# Patient Record
Sex: MALE | Race: WHITE | NOT HISPANIC OR LATINO | Employment: FULL TIME | URBAN - METROPOLITAN AREA
[De-identification: names, ages, dates, MRNs, and addresses within clinical notes are randomized per-mention and may not be internally consistent; named-entity substitution may affect disease eponyms.]

---

## 2018-02-13 ENCOUNTER — OFFICE VISIT (OUTPATIENT)
Dept: SURGERY | Facility: CLINIC | Age: 38
End: 2018-02-13

## 2018-02-13 VITALS — BODY MASS INDEX: 29.46 KG/M2 | HEART RATE: 73 BPM | WEIGHT: 205.8 LBS | HEIGHT: 70 IN | OXYGEN SATURATION: 98 %

## 2018-02-13 DIAGNOSIS — L02.415 CUTANEOUS ABSCESS OF RIGHT LOWER EXTREMITY: Primary | ICD-10-CM

## 2018-02-13 PROCEDURE — 99242 OFF/OP CONSLTJ NEW/EST SF 20: CPT | Performed by: SURGERY

## 2018-02-13 PROCEDURE — 10060 I&D ABSCESS SIMPLE/SINGLE: CPT | Performed by: SURGERY

## 2018-02-13 RX ORDER — SULFAMETHOXAZOLE AND TRIMETHOPRIM 800; 160 MG/1; MG/1
1 TABLET ORAL 2 TIMES DAILY
COMMUNITY
Start: 2018-02-12

## 2018-02-13 RX ORDER — ACETAMINOPHEN 500 MG
500 TABLET ORAL EVERY 6 HOURS PRN
COMMUNITY

## 2018-02-13 NOTE — PROGRESS NOTES
Subjective   Anupam Woodard is a 37 y.o. male who presents to the office in surgical consultation from Fe Osborne MD for a subcutaneous abscess.    History of Present Illness     The patient developed a very sore area on his right thigh for the past several days.  It has gotten increasingly red and more painful.  He was seen by a physician yesterday who performed a culture and treated him for MRSA.  He was referred to our office for a possible incision and drainage of the abscess.    Review of Systems   Constitutional: Negative for activity change, appetite change, fatigue and fever.   HENT: Negative for trouble swallowing and voice change.    Respiratory: Negative for chest tightness and shortness of breath.    Cardiovascular: Negative for chest pain and palpitations.   Gastrointestinal: Negative for abdominal pain, blood in stool, constipation, diarrhea, nausea and vomiting.   Endocrine: Negative for cold intolerance and heat intolerance.   Genitourinary: Negative for dysuria and flank pain.   Neurological: Negative for dizziness and light-headedness.   Hematological: Negative for adenopathy. Does not bruise/bleed easily.   Psychiatric/Behavioral: Negative for agitation and confusion.     Past Medical History:   Diagnosis Date   • Hypertension      History reviewed. No pertinent surgical history.  History reviewed. No pertinent family history.  Social History     Social History   • Marital status: Single     Spouse name: N/A   • Number of children: N/A   • Years of education: N/A     Occupational History   • Not on file.     Social History Main Topics   • Smoking status: Never Smoker   • Smokeless tobacco: Never Used   • Alcohol use Yes   • Drug use: No   • Sexual activity: Not on file     Other Topics Concern   • Not on file     Social History Narrative   • No narrative on file       Objective   Physical Exam   Constitutional: He is oriented to person, place, and time. He appears well-developed and  well-nourished.  Non-toxic appearance.   Eyes: EOM are normal. No scleral icterus.   Pulmonary/Chest: Effort normal. No respiratory distress.   Abdominal: Normal appearance.   Neurological: He is alert and oriented to person, place, and time.   Skin: Skin is warm and dry.   There is a 4 cm abscess of the right thigh with overlying cellulitis and palpable fluctuance.   Psychiatric: He has a normal mood and affect. His behavior is normal. Judgment and thought content normal.     Procedure  The abscess of the right thigh was prepped and draped in the standard surgical fashion.  The skin overlying the abscess was infiltrated with 1% lidocaine without epinephrine.  An incision was made with a scalpel carried through the skin into the abscess cavity with purulent material encountered.  The abscess was completely evacuated and irrigated with the local anesthetic.  It was then packed with iodoform followed by dressing.  The patient tolerated procedure well.    Assessment/Plan       The encounter diagnosis was Cutaneous abscess of right lower extremity.    The patient has an abscess of the right thigh.  An incision and drainage of the abscess was performed in the office.  Local wound care was discussed with him.  He will follow-up on an as-needed basis.

## 2021-02-26 ENCOUNTER — OFFICE (OUTPATIENT)
Dept: URBAN - METROPOLITAN AREA CLINIC 75 | Facility: CLINIC | Age: 41
End: 2021-02-26

## 2021-02-26 VITALS — HEIGHT: 70 IN | TEMPERATURE: 97.3 F | HEART RATE: 75 BPM | OXYGEN SATURATION: 98 % | WEIGHT: 207 LBS

## 2021-02-26 DIAGNOSIS — E73.9 LACTOSE INTOLERANCE, UNSPECIFIED: ICD-10-CM

## 2021-02-26 DIAGNOSIS — R11.2 NAUSEA WITH VOMITING, UNSPECIFIED: ICD-10-CM

## 2021-02-26 DIAGNOSIS — R10.13 EPIGASTRIC PAIN: ICD-10-CM

## 2021-02-26 PROCEDURE — 99203 OFFICE O/P NEW LOW 30 MIN: CPT | Performed by: INTERNAL MEDICINE

## 2021-03-03 ENCOUNTER — TRANSCRIBE ORDERS (OUTPATIENT)
Dept: ADMINISTRATIVE | Facility: HOSPITAL | Age: 41
End: 2021-03-03

## 2021-03-03 DIAGNOSIS — R10.13 EPIGASTRIC PAIN: Primary | ICD-10-CM

## 2021-04-01 VITALS
HEART RATE: 76 BPM | HEART RATE: 72 BPM | SYSTOLIC BLOOD PRESSURE: 124 MMHG | RESPIRATION RATE: 11 BRPM | OXYGEN SATURATION: 98 % | SYSTOLIC BLOOD PRESSURE: 132 MMHG | DIASTOLIC BLOOD PRESSURE: 88 MMHG | HEART RATE: 70 BPM | DIASTOLIC BLOOD PRESSURE: 79 MMHG | RESPIRATION RATE: 19 BRPM | RESPIRATION RATE: 12 BRPM | WEIGHT: 207 LBS | SYSTOLIC BLOOD PRESSURE: 154 MMHG | TEMPERATURE: 98.1 F | SYSTOLIC BLOOD PRESSURE: 128 MMHG | HEART RATE: 68 BPM | DIASTOLIC BLOOD PRESSURE: 87 MMHG | HEART RATE: 63 BPM | RESPIRATION RATE: 16 BRPM | SYSTOLIC BLOOD PRESSURE: 123 MMHG | DIASTOLIC BLOOD PRESSURE: 98 MMHG | SYSTOLIC BLOOD PRESSURE: 136 MMHG | RESPIRATION RATE: 14 BRPM | OXYGEN SATURATION: 96 % | RESPIRATION RATE: 17 BRPM | HEART RATE: 67 BPM | HEIGHT: 70 IN | SYSTOLIC BLOOD PRESSURE: 120 MMHG | DIASTOLIC BLOOD PRESSURE: 69 MMHG | OXYGEN SATURATION: 100 % | DIASTOLIC BLOOD PRESSURE: 68 MMHG | DIASTOLIC BLOOD PRESSURE: 76 MMHG | OXYGEN SATURATION: 99 % | RESPIRATION RATE: 18 BRPM | HEART RATE: 69 BPM | HEART RATE: 74 BPM | SYSTOLIC BLOOD PRESSURE: 119 MMHG | TEMPERATURE: 97.6 F | SYSTOLIC BLOOD PRESSURE: 149 MMHG

## 2021-04-05 ENCOUNTER — AMBULATORY SURGICAL CENTER (OUTPATIENT)
Dept: URBAN - METROPOLITAN AREA SURGERY 17 | Facility: SURGERY | Age: 41
End: 2021-04-05

## 2021-04-05 ENCOUNTER — OFFICE (OUTPATIENT)
Dept: URBAN - METROPOLITAN AREA PATHOLOGY 4 | Facility: PATHOLOGY | Age: 41
End: 2021-04-05

## 2021-04-05 DIAGNOSIS — K31.89 OTHER DISEASES OF STOMACH AND DUODENUM: ICD-10-CM

## 2021-04-05 DIAGNOSIS — E73.9 LACTOSE INTOLERANCE, UNSPECIFIED: ICD-10-CM

## 2021-04-05 DIAGNOSIS — K22.8 OTHER SPECIFIED DISEASES OF ESOPHAGUS: ICD-10-CM

## 2021-04-05 DIAGNOSIS — K21.00 GASTRO-ESOPHAGEAL REFLUX DISEASE WITH ESOPHAGITIS, WITHOUT B: ICD-10-CM

## 2021-04-05 DIAGNOSIS — K29.60 OTHER GASTRITIS WITHOUT BLEEDING: ICD-10-CM

## 2021-04-05 DIAGNOSIS — R10.13 EPIGASTRIC PAIN: ICD-10-CM

## 2021-04-05 DIAGNOSIS — R11.2 NAUSEA WITH VOMITING, UNSPECIFIED: ICD-10-CM

## 2021-04-05 LAB
GI HISTOLOGY: A. UNSPECIFIED: (no result)
GI HISTOLOGY: B. UNSPECIFIED: (no result)
GI HISTOLOGY: C. UNSPECIFIED: (no result)
GI HISTOLOGY: D. UNSPECIFIED: (no result)
GI HISTOLOGY: PDF REPORT: (no result)

## 2021-04-05 PROCEDURE — 43239 EGD BIOPSY SINGLE/MULTIPLE: CPT | Performed by: INTERNAL MEDICINE

## 2021-04-05 PROCEDURE — 88342 IMHCHEM/IMCYTCHM 1ST ANTB: CPT | Performed by: INTERNAL MEDICINE

## 2021-04-05 PROCEDURE — 88305 TISSUE EXAM BY PATHOLOGIST: CPT | Performed by: INTERNAL MEDICINE

## 2021-12-15 ENCOUNTER — TRANSCRIBE ORDERS (OUTPATIENT)
Dept: ADMINISTRATIVE | Facility: HOSPITAL | Age: 41
End: 2021-12-15

## 2021-12-15 DIAGNOSIS — U07.1 CLINICAL DIAGNOSIS OF SEVERE ACUTE RESPIRATORY SYNDROME CORONAVIRUS 2 (SARS-COV-2) DISEASE: Primary | ICD-10-CM

## 2021-12-16 ENCOUNTER — HOSPITAL ENCOUNTER (OUTPATIENT)
Dept: INFUSION THERAPY | Facility: HOSPITAL | Age: 41
Setting detail: INFUSION SERIES
Discharge: HOME | End: 2021-12-16

## 2021-12-16 VITALS
OXYGEN SATURATION: 98 % | HEART RATE: 59 BPM | RESPIRATION RATE: 18 BRPM | TEMPERATURE: 97.6 F | SYSTOLIC BLOOD PRESSURE: 137 MMHG | DIASTOLIC BLOOD PRESSURE: 79 MMHG

## 2021-12-16 PROCEDURE — M0243 CASIRIVI AND IMDEVI INFUSION: HCPCS | Performed by: FAMILY MEDICINE

## 2021-12-16 PROCEDURE — 25010000002 INJECTION, CASIRIVIMAB AND IMDEVIMAB, 1200 MG: Performed by: FAMILY MEDICINE

## 2021-12-16 RX ORDER — DIPHENHYDRAMINE HCL 50 MG
50 CAPSULE ORAL ONCE AS NEEDED
Status: DISCONTINUED | OUTPATIENT
Start: 2021-12-16 | End: 2021-12-18 | Stop reason: HOSPADM

## 2021-12-16 RX ORDER — DIPHENHYDRAMINE HYDROCHLORIDE 50 MG/ML
50 INJECTION INTRAMUSCULAR; INTRAVENOUS ONCE AS NEEDED
Status: DISCONTINUED | OUTPATIENT
Start: 2021-12-16 | End: 2021-12-18 | Stop reason: HOSPADM

## 2021-12-16 RX ORDER — EPINEPHRINE 1 MG/ML
0.3 INJECTION, SOLUTION, CONCENTRATE INTRAVENOUS AS NEEDED
Status: DISCONTINUED | OUTPATIENT
Start: 2021-12-16 | End: 2021-12-18 | Stop reason: HOSPADM

## 2021-12-16 RX ADMIN — IMDEVIMAB: 300 INJECTION, SOLUTION, CONCENTRATE INTRAVENOUS at 14:44

## 2021-12-16 NOTE — NURSING NOTE
"Pt arrived to [facility] for SQ regeneron per MD order. Pt given handout titled, \"Fact Sheet for Patients, Parents and Caregivers: Emergency Use Authorization of Regen-cov\" prior to administration of SQ regeneron. RN educated pt on injection process, to not rcv any covid vaccine for 90 days, to go to ER for any issues with worsening breathing and to call PCP if symptoms are not improving. Pt vu. Pt denies any questions at this time.     MD order located in media tab    Regeneron given x4 injections consecutively, each at a different injection sites. See MAR for additional administration information. Pt tolerated each injection without any issues. Pt instructed to remain in room for 1 hour for post monitoring period. Call light within reach.    1550- Post monitoring complete. VS obtained and documented. No change in pt assessment. AVS given to pt. Pt escorted to private entrance and discharged in stable condition.     "

## 2024-01-02 ENCOUNTER — OFFICE VISIT (OUTPATIENT)
Dept: SURGERY | Facility: CLINIC | Age: 44
End: 2024-01-02
Payer: COMMERCIAL

## 2024-01-02 VITALS
WEIGHT: 195.4 LBS | BODY MASS INDEX: 27.97 KG/M2 | SYSTOLIC BLOOD PRESSURE: 118 MMHG | HEIGHT: 70 IN | DIASTOLIC BLOOD PRESSURE: 78 MMHG

## 2024-01-02 DIAGNOSIS — D17.1 LIPOMA OF TORSO: Primary | ICD-10-CM

## 2024-01-02 PROCEDURE — 99202 OFFICE O/P NEW SF 15 MIN: CPT | Performed by: SURGERY

## 2024-01-02 RX ORDER — VILAZODONE HYDROCHLORIDE 10 MG/1
TABLET ORAL DAILY
COMMUNITY

## 2024-01-02 RX ORDER — ATORVASTATIN CALCIUM 20 MG/1
20 TABLET, FILM COATED ORAL DAILY
COMMUNITY

## 2024-01-02 RX ORDER — HYDROCHLOROTHIAZIDE 12.5 MG/1
12.5 TABLET ORAL DAILY
COMMUNITY

## 2024-01-02 NOTE — PROGRESS NOTES
Subjective   Anupam Woodard is a 43 y.o. 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    History of Present Illness     The patient has noticed subcutaneous nodules of the low back with a particular right lower back lesion that seems to be in the area of intermittent pain.  He states that he will have episodes of severe pain in the right lower back that is excruciating and prevents him from bending and moving.  He will also have some radicular symptoms down his leg.  Those episodes last about 4-5 days and then improved.  Whenever he has those episodes, he will feel his lower back and noticed that the center of the pain seems to be in the area of that lesion.  He, therefore, thought that lesion was the source of the symptoms.  He has those episodes 2-3 a year.    Review of Systems   Constitutional:  Negative for fatigue and fever.   Respiratory:  Negative for chest tightness and shortness of breath.    Cardiovascular:  Negative for chest pain and palpitations.   Gastrointestinal:  Negative for abdominal pain, blood in stool, constipation, diarrhea, nausea and vomiting.     Past Medical History:   Diagnosis Date    Hypertension      History reviewed. No pertinent surgical history.  History reviewed. No pertinent family history.  Social History     Socioeconomic History    Marital status: Single   Tobacco Use    Smoking status: Never    Smokeless tobacco: Never   Vaping Use    Vaping Use: Never used   Substance and Sexual Activity    Alcohol use: Yes    Drug use: No    Sexual activity: Defer       Objective   Physical Exam  Constitutional:       Appearance: Normal appearance. He is well-developed. He is not toxic-appearing.   Eyes:      General: No scleral icterus.  Pulmonary:      Effort: Pulmonary effort is normal. No respiratory distress.   Skin:     General: Skin is warm and dry.      Comments: There is a 3 cm soft tissue neoplasm  of the right lower back that is mobile with well-defined margins.  There is a 3 cm soft tissue neoplasm of the left lower back that is also mobile with well-defined margins.   Neurological:      Mental Status: He is alert and oriented to person, place, and time.   Psychiatric:         Behavior: Behavior normal.         Thought Content: Thought content normal.         Judgment: Judgment normal.              Assessment & Plan     1.  Soft tissue neoplasm: The patient has a mobile and well-defined soft tissue neoplasm of both the right lower back and left lower back.  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  These lipomas appear to be completely located in the subcutaneous tissue with no involvement in the underlying muscle.  His symptoms of pain are much more consistent with muscle spasm and radicular pain.  They suggest underlying lumbar disc disease.  It was explained to him that these lipomas are not likely to be the source of his intermittent pain and, therefore, removing the lipoma will not change that cycle.  Based on that discussion, he is not interested in having an excision of the lipoma.  He will discuss with his family doctor regarding further evaluation of lumbar disc disease.  He will follow-up on an as-needed basis.

## 2024-01-02 NOTE — LETTER
January 2, 2024     Fe Osborne MD     Patient: Anupam Woodard   YOB: 1980   Date of Visit: 1/2/2024     Dear Fe Osborne MD:       Thank you for referring Anupam Woodard to me for evaluation. Below are the relevant portions of my assessment and plan of care.    If you have questions, please do not hesitate to call me. I look forward to following Anupam along with you.         Sincerely,        Gilles Yu Jr., MD        CC:   No Recipients    Gilles Yu Jr., MD  01/02/24 0950  Sign when Signing Visit  Subjective  Anupam Woodard is a 43 y.o. 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    History of Present Illness     The patient has noticed subcutaneous nodules of the low back with a particular right lower back lesion that seems to be in the area of intermittent pain.  He states that he will have episodes of severe pain in the right lower back that is excruciating and prevents him from bending and moving.  He will also have some radicular symptoms down his leg.  Those episodes last about 4-5 days and then improved.  Whenever he has those episodes, he will feel his lower back and noticed that the center of the pain seems to be in the area of that lesion.  He, therefore, thought that lesion was the source of the symptoms.  He has those episodes 2-3 a year.    Review of Systems   Constitutional:  Negative for fatigue and fever.   Respiratory:  Negative for chest tightness and shortness of breath.    Cardiovascular:  Negative for chest pain and palpitations.   Gastrointestinal:  Negative for abdominal pain, blood in stool, constipation, diarrhea, nausea and vomiting.     Past Medical History:   Diagnosis Date   • Hypertension      History reviewed. No pertinent surgical history.  History reviewed. No pertinent family history.  Social History     Socioeconomic History   • Marital status: Single    Tobacco Use   • Smoking status: Never   • Smokeless tobacco: Never   Vaping Use   • Vaping Use: Never used   Substance and Sexual Activity   • Alcohol use: Yes   • Drug use: No   • Sexual activity: Defer       Objective  Physical Exam  Constitutional:       Appearance: Normal appearance. He is well-developed. He is not toxic-appearing.   Eyes:      General: No scleral icterus.  Pulmonary:      Effort: Pulmonary effort is normal. No respiratory distress.   Skin:     General: Skin is warm and dry.      Comments: There is a 3 cm soft tissue neoplasm of the right lower back that is mobile with well-defined margins.  There is a 3 cm soft tissue neoplasm of the left lower back that is also mobile with well-defined margins.   Neurological:      Mental Status: He is alert and oriented to person, place, and time.   Psychiatric:         Behavior: Behavior normal.         Thought Content: Thought content normal.         Judgment: Judgment normal.              Assessment & Plan    1.  Soft tissue neoplasm: The patient has a mobile and well-defined soft tissue neoplasm of both the right lower back and left lower back.  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  These lipomas appear to be completely located in the subcutaneous tissue with no involvement in the underlying muscle.  His symptoms of pain are much more consistent with muscle spasm and radicular pain.  They suggest underlying lumbar disc disease.  It was explained to him that these lipomas are not likely to be the source of his intermittent pain and, therefore, removing the lipoma will not change that cycle.  Based on that discussion, he is not interested in having an excision of the lipoma.  He will discuss with his family doctor regarding further evaluation of lumbar disc disease.  He will follow-up on an as-needed basis.

## 2024-01-05 ENCOUNTER — PATIENT ROUNDING (BHMG ONLY) (OUTPATIENT)
Dept: SURGERY | Facility: CLINIC | Age: 44
End: 2024-01-05
Payer: COMMERCIAL

## 2024-01-05 NOTE — PROGRESS NOTES
January 5, 2024    Hello, may I speak with Anupam Woodard?    My name is Vishal      I am  with [facility]  [address]  [place] [address] [phone].    Before we get started may I verify your date of birth? 1980    I am calling to officially welcome you to our practice and ask about your recent visit. Is this a good time to talk? yes    Tell me about your visit with us. What things went well?  Everything went well.        We're always looking for ways to make our patients' experiences even better. Do you have recommendations on ways we may improve?  no    Overall were you satisfied with your first visit to our practice? yes       I appreciate you taking the time to speak with me today. Is there anything else I can do for you? no      Thank you, and have a great day.